# Patient Record
Sex: FEMALE | Race: WHITE | URBAN - METROPOLITAN AREA
[De-identification: names, ages, dates, MRNs, and addresses within clinical notes are randomized per-mention and may not be internally consistent; named-entity substitution may affect disease eponyms.]

---

## 2020-08-28 ENCOUNTER — TELEPHONE ENCOUNTER (OUTPATIENT)
Dept: URBAN - METROPOLITAN AREA CLINIC 77 | Facility: CLINIC | Age: 30
End: 2020-08-28

## 2020-08-31 ENCOUNTER — OFFICE VISIT (OUTPATIENT)
Dept: URBAN - METROPOLITAN AREA CLINIC 23 | Facility: CLINIC | Age: 30
End: 2020-08-31

## 2020-09-16 ENCOUNTER — TELEPHONE ENCOUNTER (OUTPATIENT)
Dept: URBAN - METROPOLITAN AREA CLINIC 35 | Facility: CLINIC | Age: 30
End: 2020-09-16

## 2020-09-21 ENCOUNTER — OFFICE VISIT (OUTPATIENT)
Dept: URBAN - METROPOLITAN AREA CLINIC 33 | Facility: CLINIC | Age: 30
End: 2020-09-21

## 2020-09-21 VITALS
DIASTOLIC BLOOD PRESSURE: 84 MMHG | BODY MASS INDEX: 39.21 KG/M2 | WEIGHT: 244 LBS | HEIGHT: 66 IN | SYSTOLIC BLOOD PRESSURE: 126 MMHG

## 2020-09-21 RX ORDER — ALPRAZOLAM 1 MG/1
1 TABLET TABLET ORAL TWICE A DAY
Status: ACTIVE | COMMUNITY

## 2020-09-21 RX ORDER — ZIPRASIDONE HYDROCHLORIDE 20 MG/1
1 CAPSULE WITH FOOD CAPSULE ORAL TWICE A DAY
Qty: 60 | Status: ACTIVE | COMMUNITY

## 2020-09-21 RX ORDER — LEVONORGESTREL 19.5 MG/1
AS DIRECTED INTRAUTERINE DEVICE INTRAUTERINE
Status: ACTIVE | COMMUNITY

## 2020-09-21 RX ORDER — DICYCLOMINE HYDROCHLORIDE 20 MG/1
1 TABLET AS NEEDED TABLET ORAL THREE TIMES A DAY
Qty: 30 TABLET | Refills: 1 | OUTPATIENT
Start: 2020-09-21

## 2020-09-21 NOTE — EXAM-MIGRATED EXAMINATIONS
General Examination : Medical assistant was in the room during the examination;     GENERAL APPEARANCE: - alert, in no acute distress, well developed, well nourished;   HEAD: - normocephalic, atraumatic;   ORAL CAVITY: - mucosa moist;   THROAT: - clear;   HEART: - no murmurs, regular rate and rhythm, S1, S2 normal;   LUNGS: - clear to auscultation bilaterally, good air movement, no wheezes, rales, rhonchi;   ABDOMEN: - bowel sounds present, no masses palpable, no organomegaly , no rebound tenderness, soft, nontender, nondistended;

## 2020-09-21 NOTE — HPI-MIGRATED HPI
;     Elevated Liver Enzymes : 30 year old female patient presents today for consultation about recent elevated liver enzymes.  Denies a history of elevated liver enzymes. She currently denies  jaundice, chills, dizziness, easy bruising, fatigue.  Admits to RUQ dull pain that comes and goes with no relief from ibuprofen given by her PCP.  Admits to having lower below umbilical abdominal pain may be associated with IUD.   RISK FACTORS:  Denies Alcohol, drugs, travel outside the [place], NSAIDs, protein supplements, tattoos, piercings,  service, blood transfusion, family history of liver disease or cancer, personal exposure to liver diseases, snf, rehab  Admits to taking NSAID prn for headache about two three weeks ago but has since stopped.  She had a abdominal/ pelvic ultrasound on 08/06/2020. Denies alcohol use since sep 2018.  She Admits to taking antibiotics in June for five days three times a day. She doesn't remember what kind and thinks they were for a kidney infection.  ;

## 2020-10-19 ENCOUNTER — OFFICE VISIT (OUTPATIENT)
Dept: URBAN - METROPOLITAN AREA CLINIC 33 | Facility: CLINIC | Age: 30
End: 2020-10-19

## 2020-10-19 VITALS
BODY MASS INDEX: 40.18 KG/M2 | WEIGHT: 250 LBS | DIASTOLIC BLOOD PRESSURE: 72 MMHG | HEIGHT: 66 IN | SYSTOLIC BLOOD PRESSURE: 126 MMHG

## 2020-10-19 PROBLEM — 274774002 ELEVATED LEVELS OF TRANSAMINASE & LACTIC ACID DEHYDROGENASE: Status: ACTIVE | Noted: 2020-09-21

## 2020-10-19 PROBLEM — 102614006 GENERALIZED ABDOMINAL PAIN: Status: ACTIVE | Noted: 2020-09-21

## 2020-10-19 RX ORDER — ALPRAZOLAM 1 MG/1
1 TABLET TABLET ORAL TWICE A DAY
Status: ACTIVE | COMMUNITY

## 2020-10-19 RX ORDER — ZIPRASIDONE HYDROCHLORIDE 20 MG/1
1 CAPSULE WITH FOOD CAPSULE ORAL TWICE A DAY
Qty: 60 | Status: ON HOLD | COMMUNITY

## 2020-10-19 RX ORDER — DICYCLOMINE HYDROCHLORIDE 20 MG/1
1 TABLET AS NEEDED TABLET ORAL THREE TIMES A DAY
Qty: 30 TABLET | Refills: 1 | Status: ACTIVE | COMMUNITY
Start: 2020-09-21

## 2020-10-19 RX ORDER — LEVONORGESTREL 19.5 MG/1
AS DIRECTED INTRAUTERINE DEVICE INTRAUTERINE
Status: ACTIVE | COMMUNITY

## 2020-10-19 NOTE — HPI-MIGRATED HPI
;   ;     Abdominal Pain : Patient presents for follow up of abdominal pain. She admits improvement of symptoms . Patient admits  taking Dicyclomine as needed since last visit . She denies nausea or vomiting .   Patient states for the last three day she has experience some sharp abdominal pain located in her lower abdominal area below her umbilical region. ;   Elevated Liver Enzymes :                Patient presents today for a follow up of Elevated liver enzymes. Patient denies any associated symptoms at this time. Patient currently denies  jaundice, chills, RUQ pains, dizziness, easy bruising, fatigue.    Admits to using Acetaminophen NSAIDs at least once a week.   RISK FACTORS: Denies  Alcohol use, drug use, travel outside the [place], NSAIDs, protein supplements, tattoos, piercings,  service, blood transfusion, family history of liver disease or cancer, personal exposure to liver diseases, [facility], rehab       Last visit (09/21/2020) 30 year old female patient presents today for consultation about recent elevated liver enzymes.  Denies a history of elevated liver enzymes. She currently denies  jaundice, chills, dizziness, easy bruising, fatigue.  Admits to RUQ dull pain that comes and goes with no relief from ibuprofen given by her PCP.  Admits to having lower below umbilical abdominal pain may be associated with IUD.   RISK FACTORS:  Denies Alcohol, drugs, travel outside the [place], NSAIDs, protein supplements, tattoos, piercings,  service, blood transfusion, family history of liver disease or cancer, personal exposure to liver diseases, [facility], rehab  Admits to taking NSAID prn for headache about two three weeks ago but has since stopped.  She had a abdominal/ pelvic ultrasound on 08/06/2020. Denies alcohol use since sep 2018.  She Admits to taking antibiotics in June for five days three times a day. She doesn't remember what kind and thinks they were for a kidney infection.  ;

## 2021-01-11 ENCOUNTER — OFFICE VISIT (OUTPATIENT)
Dept: URBAN - METROPOLITAN AREA CLINIC 33 | Facility: CLINIC | Age: 31
End: 2021-01-11